# Patient Record
Sex: FEMALE | Race: OTHER | Employment: UNEMPLOYED | URBAN - METROPOLITAN AREA
[De-identification: names, ages, dates, MRNs, and addresses within clinical notes are randomized per-mention and may not be internally consistent; named-entity substitution may affect disease eponyms.]

---

## 2017-06-03 ENCOUNTER — HOSPITAL ENCOUNTER (EMERGENCY)
Facility: HOSPITAL | Age: 42
Discharge: HOME | End: 2017-06-03
Attending: EMERGENCY MEDICINE
Payer: COMMERCIAL

## 2017-06-03 ENCOUNTER — APPOINTMENT (OUTPATIENT)
Dept: GENERAL RADIOLOGY | Facility: HOSPITAL | Age: 42
End: 2017-06-03
Attending: EMERGENCY MEDICINE
Payer: COMMERCIAL

## 2017-06-03 VITALS
HEART RATE: 75 BPM | WEIGHT: 125 LBS | OXYGEN SATURATION: 99 % | RESPIRATION RATE: 16 BRPM | SYSTOLIC BLOOD PRESSURE: 124 MMHG | HEIGHT: 60 IN | DIASTOLIC BLOOD PRESSURE: 67 MMHG | BODY MASS INDEX: 24.54 KG/M2 | TEMPERATURE: 98 F

## 2017-06-03 DIAGNOSIS — J01.00 ACUTE NON-RECURRENT MAXILLARY SINUSITIS: ICD-10-CM

## 2017-06-03 DIAGNOSIS — R42 DIZZINESS: Primary | ICD-10-CM

## 2017-06-03 DIAGNOSIS — F41.0 PANIC ATTACK: ICD-10-CM

## 2017-06-03 PROCEDURE — 99285 EMERGENCY DEPT VISIT HI MDM: CPT

## 2017-06-03 PROCEDURE — 71020 XR CHEST PA + LAT CHEST (CPT=71020): CPT | Performed by: EMERGENCY MEDICINE

## 2017-06-03 PROCEDURE — 93010 ELECTROCARDIOGRAM REPORT: CPT | Performed by: EMERGENCY MEDICINE

## 2017-06-03 PROCEDURE — 80048 BASIC METABOLIC PNL TOTAL CA: CPT | Performed by: EMERGENCY MEDICINE

## 2017-06-03 PROCEDURE — 96360 HYDRATION IV INFUSION INIT: CPT

## 2017-06-03 PROCEDURE — 84484 ASSAY OF TROPONIN QUANT: CPT | Performed by: EMERGENCY MEDICINE

## 2017-06-03 PROCEDURE — 85025 COMPLETE CBC W/AUTO DIFF WBC: CPT | Performed by: EMERGENCY MEDICINE

## 2017-06-03 PROCEDURE — 93005 ELECTROCARDIOGRAM TRACING: CPT

## 2017-06-03 RX ORDER — AMOXICILLIN 500 MG/1
500 TABLET, FILM COATED ORAL 3 TIMES DAILY
Qty: 30 TABLET | Refills: 0 | Status: SHIPPED | OUTPATIENT
Start: 2017-06-03 | End: 2017-06-13

## 2017-06-04 NOTE — ED INITIAL ASSESSMENT (HPI)
Pt here with sudden onset SOB, dizziness, and chest pain this afternoon. Family states she has had \"mucus\" cough for 3 weeks.

## 2017-06-04 NOTE — ED PROVIDER NOTES
Patient Seen in: [facility] AND [facility] Emergency Department    History   Patient presents with:  Chest Pain Angina (cardiovascular)    Stated Complaint: Chest Pain    HPI    The patient is a 40-year-old female who has had nasal congestion facial pressure and air)       Current:/73 mmHg  Pulse 72  Temp(Src) 98 °F (36.7 °C)  Resp 14  Ht 152.4 cm (5')  Wt 56.7 kg  BMI 24.41 kg/m2  SpO2 99%  Breastfeeding? No        Physical Exam   Constitutional: She is oriented to person, place, and time.  She appears well- TROPONIN I, 0 HOUR - Normal   CBC WITH DIFFERENTIAL WITH PLATELET    Narrative: The following orders were created for panel order CBC WITH DIFFERENTIAL WITH PLATELET.   Procedure                               Abnormality         Status

## 2017-06-04 NOTE — ED NOTES
Patient c/o mid-sternal cp that started today and has become worse. Patient does not appear to be in distress, is a/o X4, and acting appropriate for age.

## 2017-09-01 ENCOUNTER — APPOINTMENT (OUTPATIENT)
Dept: ULTRASOUND IMAGING | Facility: HOSPITAL | Age: 42
End: 2017-09-01
Attending: EMERGENCY MEDICINE
Payer: COMMERCIAL

## 2017-09-01 ENCOUNTER — HOSPITAL ENCOUNTER (EMERGENCY)
Facility: HOSPITAL | Age: 42
Discharge: HOME | End: 2017-09-02
Attending: EMERGENCY MEDICINE
Payer: COMMERCIAL

## 2017-09-01 VITALS
TEMPERATURE: 98 F | HEIGHT: 61 IN | RESPIRATION RATE: 18 BRPM | DIASTOLIC BLOOD PRESSURE: 75 MMHG | WEIGHT: 125 LBS | BODY MASS INDEX: 23.6 KG/M2 | HEART RATE: 66 BPM | SYSTOLIC BLOOD PRESSURE: 113 MMHG | OXYGEN SATURATION: 98 %

## 2017-09-01 DIAGNOSIS — R10.13 DYSPEPSIA: Primary | ICD-10-CM

## 2017-09-01 LAB
ALBUMIN SERPL BCP-MCNC: 4.6 G/DL (ref 3.5–4.8)
ALP SERPL-CCNC: 27 U/L (ref 32–100)
ALT SERPL-CCNC: 17 U/L (ref 14–54)
ANION GAP SERPL CALC-SCNC: 11 MMOL/L (ref 0–18)
AST SERPL-CCNC: 27 U/L (ref 15–41)
BASOPHILS # BLD: 0 K/UL (ref 0–0.2)
BASOPHILS NFR BLD: 0 %
BILIRUB DIRECT SERPL-MCNC: 0.2 MG/DL (ref 0–0.2)
BILIRUB SERPL-MCNC: 0.8 MG/DL (ref 0.3–1.2)
BILIRUB UR QL: NEGATIVE
BUN SERPL-MCNC: 14 MG/DL (ref 8–20)
BUN/CREAT SERPL: 18.7 (ref 10–20)
CALCIUM SERPL-MCNC: 9.5 MG/DL (ref 8.5–10.5)
CHLORIDE SERPL-SCNC: 105 MMOL/L (ref 95–110)
CHOLEST SERPL-MCNC: 191 MG/DL (ref 110–200)
CO2 SERPL-SCNC: 20 MMOL/L (ref 22–32)
COLOR UR: YELLOW
CREAT SERPL-MCNC: 0.75 MG/DL (ref 0.5–1.5)
EOSINOPHIL # BLD: 0 K/UL (ref 0–0.7)
EOSINOPHIL NFR BLD: 0 %
ERYTHROCYTE [DISTWIDTH] IN BLOOD BY AUTOMATED COUNT: 11.9 % (ref 11–15)
GLUCOSE SERPL-MCNC: 128 MG/DL (ref 70–99)
GLUCOSE UR-MCNC: NEGATIVE MG/DL
HCT VFR BLD AUTO: 36.8 % (ref 35–48)
HDLC SERPL-MCNC: 50 MG/DL
HGB BLD-MCNC: 12.5 G/DL (ref 12–16)
HGB UR QL STRIP.AUTO: NEGATIVE
HYALINE CASTS #/AREA URNS AUTO: 1 /LPF
KETONES UR-MCNC: NEGATIVE MG/DL
LDLC SERPL CALC-MCNC: 134 MG/DL (ref 0–99)
LIPASE SERPL-CCNC: 29 U/L (ref 22–51)
LYMPHOCYTES # BLD: 1.5 K/UL (ref 1–4)
LYMPHOCYTES NFR BLD: 14 %
MCH RBC QN AUTO: 30.8 PG (ref 27–32)
MCHC RBC AUTO-ENTMCNC: 34 G/DL (ref 32–37)
MCV RBC AUTO: 90.4 FL (ref 80–100)
MONOCYTES # BLD: 0.3 K/UL (ref 0–1)
MONOCYTES NFR BLD: 3 %
NEUTROPHILS # BLD AUTO: 8.3 K/UL (ref 1.8–7.7)
NEUTROPHILS NFR BLD: 82 %
NITRITE UR QL STRIP.AUTO: NEGATIVE
NONHDLC SERPL-MCNC: 141 MG/DL
OSMOLALITY UR CALC.SUM OF ELEC: 284 MOSM/KG (ref 275–295)
PH UR: 8 [PH] (ref 5–8)
PLATELET # BLD AUTO: 264 K/UL (ref 140–400)
PMV BLD AUTO: 8 FL (ref 7.4–10.3)
POTASSIUM SERPL-SCNC: 3.4 MMOL/L (ref 3.3–5.1)
PROT SERPL-MCNC: 7.9 G/DL (ref 5.9–8.4)
PROT UR-MCNC: NEGATIVE MG/DL
RBC # BLD AUTO: 4.07 M/UL (ref 3.7–5.4)
RBC #/AREA URNS AUTO: 1 /HPF
SODIUM SERPL-SCNC: 136 MMOL/L (ref 136–144)
SP GR UR STRIP: 1.02 (ref 1–1.03)
TRIGL SERPL-MCNC: 36 MG/DL (ref 1–149)
UROBILINOGEN UR STRIP-ACNC: <2
VIT C UR-MCNC: NEGATIVE MG/DL
WBC # BLD AUTO: 10.2 K/UL (ref 4–11)
WBC #/AREA URNS AUTO: 11 /HPF

## 2017-09-01 PROCEDURE — 87077 CULTURE AEROBIC IDENTIFY: CPT | Performed by: EMERGENCY MEDICINE

## 2017-09-01 PROCEDURE — 96375 TX/PRO/DX INJ NEW DRUG ADDON: CPT

## 2017-09-01 PROCEDURE — 80048 BASIC METABOLIC PNL TOTAL CA: CPT | Performed by: EMERGENCY MEDICINE

## 2017-09-01 PROCEDURE — 99285 EMERGENCY DEPT VISIT HI MDM: CPT

## 2017-09-01 PROCEDURE — 80061 LIPID PANEL: CPT | Performed by: EMERGENCY MEDICINE

## 2017-09-01 PROCEDURE — 85025 COMPLETE CBC W/AUTO DIFF WBC: CPT | Performed by: EMERGENCY MEDICINE

## 2017-09-01 PROCEDURE — 80076 HEPATIC FUNCTION PANEL: CPT

## 2017-09-01 PROCEDURE — 80076 HEPATIC FUNCTION PANEL: CPT | Performed by: EMERGENCY MEDICINE

## 2017-09-01 PROCEDURE — 96374 THER/PROPH/DIAG INJ IV PUSH: CPT

## 2017-09-01 PROCEDURE — C9113 INJ PANTOPRAZOLE SODIUM, VIA: HCPCS | Performed by: EMERGENCY MEDICINE

## 2017-09-01 PROCEDURE — 93005 ELECTROCARDIOGRAM TRACING: CPT

## 2017-09-01 PROCEDURE — 83690 ASSAY OF LIPASE: CPT | Performed by: EMERGENCY MEDICINE

## 2017-09-01 PROCEDURE — 85025 COMPLETE CBC W/AUTO DIFF WBC: CPT

## 2017-09-01 PROCEDURE — 80061 LIPID PANEL: CPT

## 2017-09-01 PROCEDURE — 76705 ECHO EXAM OF ABDOMEN: CPT | Performed by: EMERGENCY MEDICINE

## 2017-09-01 PROCEDURE — 80048 BASIC METABOLIC PNL TOTAL CA: CPT

## 2017-09-01 PROCEDURE — 96361 HYDRATE IV INFUSION ADD-ON: CPT

## 2017-09-01 PROCEDURE — 87186 SC STD MICRODIL/AGAR DIL: CPT | Performed by: EMERGENCY MEDICINE

## 2017-09-01 PROCEDURE — 93010 ELECTROCARDIOGRAM REPORT: CPT | Performed by: EMERGENCY MEDICINE

## 2017-09-01 PROCEDURE — 87086 URINE CULTURE/COLONY COUNT: CPT | Performed by: EMERGENCY MEDICINE

## 2017-09-01 PROCEDURE — 81001 URINALYSIS AUTO W/SCOPE: CPT | Performed by: EMERGENCY MEDICINE

## 2017-09-01 RX ORDER — ONDANSETRON 2 MG/ML
4 INJECTION INTRAMUSCULAR; INTRAVENOUS ONCE
Status: COMPLETED | OUTPATIENT
Start: 2017-09-01 | End: 2017-09-01

## 2017-09-01 RX ORDER — KETOROLAC TROMETHAMINE 30 MG/ML
30 INJECTION, SOLUTION INTRAMUSCULAR; INTRAVENOUS ONCE
Status: COMPLETED | OUTPATIENT
Start: 2017-09-01 | End: 2017-09-01

## 2017-09-02 NOTE — ED PROVIDER NOTES
Patient Seen in: [facility] AND [facility] Emergency Department     History   Patient presents with:  Chest Pain Angina (cardiovascular)    Stated Complaint: abd pain chest pain vomit since 3pm     HPI    42year old female otherwise healthy complains of sudden on without left periorbital erythema. Nose: Nose normal.   Mouth/Throat: Mucous membranes are normal. Mucous membranes are not pale and not cyanotic. Eyes: Conjunctivae and lids are normal. Right conjunctiva is not injected.  Left conjunctiva is not inject limits   CBC W/ DIFFERENTIAL - Abnormal; Notable for the following:     Neutrophil Absolute 8.3 (*)     All other components within normal limits   LIPASE - Normal   CBC WITH DIFFERENTIAL WITH PLATELET    Narrative:      The following orders were created fo Intravenous Given 9/1/17 2304)   ketorolac tromethamine (TORADOL) 30 MG/ML injection 30 mg (30 mg Intravenous Given 9/1/17 2304)         Procedures: None    Re-Evaluation: 11:40 PM, patient reports symptoms completely resolved with ED treatment     09/01/1 Dyspepsia  (primary encounter diagnosis)    Plan: Follow-up with GI for upper endoscopy and/or HIDA scan, Nexium in the meanwhile. Further Outpatient evaluation and treatment will be required.  I personally discussed the results of the above ED workup and

## 2017-12-05 ENCOUNTER — OFFICE VISIT (OUTPATIENT)
Dept: GASTROENTEROLOGY | Facility: CLINIC | Age: 42
End: 2017-12-05

## 2017-12-05 ENCOUNTER — TELEPHONE (OUTPATIENT)
Dept: GASTROENTEROLOGY | Facility: CLINIC | Age: 42
End: 2017-12-05

## 2017-12-05 VITALS
SYSTOLIC BLOOD PRESSURE: 119 MMHG | BODY MASS INDEX: 22.46 KG/M2 | HEIGHT: 65 IN | WEIGHT: 134.81 LBS | HEART RATE: 74 BPM | DIASTOLIC BLOOD PRESSURE: 76 MMHG

## 2017-12-05 DIAGNOSIS — R10.9 ABDOMINAL PAIN, UNSPECIFIED ABDOMINAL LOCATION: Primary | ICD-10-CM

## 2017-12-05 DIAGNOSIS — R11.10 VOMITING, INTRACTABILITY OF VOMITING NOT SPECIFIED, PRESENCE OF NAUSEA NOT SPECIFIED, UNSPECIFIED VOMITING TYPE: Primary | ICD-10-CM

## 2017-12-05 DIAGNOSIS — R11.2 NAUSEA AND VOMITING, INTRACTABILITY OF VOMITING NOT SPECIFIED, UNSPECIFIED VOMITING TYPE: ICD-10-CM

## 2017-12-05 PROCEDURE — 99244 OFF/OP CNSLTJ NEW/EST MOD 40: CPT | Performed by: INTERNAL MEDICINE

## 2017-12-05 NOTE — TELEPHONE ENCOUNTER
Scheduled for:  EGD  Provider Name: [facility]  Date:  12-15-17  Location:  [facility]  Sedation:  MAC  Time:  10:30am, arrival 9:30am  Prep: NPO after midnight  Meds/Allergies Reconciled?:  yes  Diagnosis with codes:  Vomiting R11.10  Was patient informed to call insura

## 2017-12-05 NOTE — PATIENT INSTRUCTIONS
Vomiting  - possible migraine headaches- see neurologist - Dr. Mckeon [place] [phone]  - EGD with MAC sedation

## 2017-12-05 NOTE — PROGRESS NOTES
True Grams is a 36year old female. HPI:   Patient presents with:  Hospital F/U      The patient is a 41-year-old female who has a 2 year history of intermittent headaches and then subsequent epigastric abdominal pain and vomiting.   The patie EXAM:   Blood pressure 119/76, pulse 74, height 5' 5\" (1.651 m), weight 134 lb 12.8 oz (61.1 kg), not currently breastfeeding.     The patient appears their stated age and is in no acute distress  HEENT- anicteric sclera, neck no lymphadnopathy, OP- clear

## 2017-12-11 NOTE — TELEPHONE ENCOUNTER
Pt would like to cancel this procedure and will call back after first of year to reschedule. Pt would like to see her neurologist first before having procedure. No need to call back unless questions.

## 2017-12-11 NOTE — TELEPHONE ENCOUNTER
Procedure cancelled with endo and removed from schedule.      Forward to surgery schedulers as Norris Regulus

## 2018-01-25 ENCOUNTER — OFFICE VISIT (OUTPATIENT)
Dept: NEUROLOGY | Facility: CLINIC | Age: 43
End: 2018-01-25

## 2018-01-25 VITALS
WEIGHT: 134 LBS | SYSTOLIC BLOOD PRESSURE: 96 MMHG | BODY MASS INDEX: 26.31 KG/M2 | DIASTOLIC BLOOD PRESSURE: 56 MMHG | HEIGHT: 60 IN | RESPIRATION RATE: 16 BRPM | HEART RATE: 54 BPM

## 2018-01-25 DIAGNOSIS — G43.809 OTHER MIGRAINE WITHOUT STATUS MIGRAINOSUS, NOT INTRACTABLE: Primary | ICD-10-CM

## 2018-01-25 PROBLEM — G43.C0 PERIODIC HEADACHE SYNDROME: Status: ACTIVE | Noted: 2018-01-25

## 2018-01-25 PROCEDURE — 99204 OFFICE O/P NEW MOD 45 MIN: CPT | Performed by: OTHER

## 2018-01-25 RX ORDER — ELETRIPTAN HYDROBROMIDE 40 MG/1
40 TABLET, FILM COATED ORAL AS NEEDED
Qty: 8 TABLET | Refills: 0 | Status: SHIPPED | OUTPATIENT
Start: 2018-01-25 | End: 2018-02-24

## 2018-01-26 NOTE — PROGRESS NOTES
She relates a 4–5 year history occurring about once every other month of left greater than right orbital for head throbbing headache nausea, emesis, blurred vision. No double vision. She denies focal symptoms in the arms or legs.   No nocturnal awakening Smoker                                                                Smokeless tobacco: Never Used                        Alcohol use: No            Other Topics            Concern  Caffeine Concern        Yes  Exercise                Yes    Comment:leona hoffmans, no clonus  Coordination: Finger to nose, heel to shin intact bilaterally. No Ataxia noted. Gait: Narrow based, negative Romberg’s sign, heel to shin intact. ASSESSMENT AND PLAN:   Migraine headaches. Gave her prescription Relpax.   Asked her

## 2018-02-16 ENCOUNTER — TELEPHONE (OUTPATIENT)
Dept: NEUROLOGY | Facility: CLINIC | Age: 43
End: 2018-02-16

## 2018-02-16 DIAGNOSIS — R51.9 INTRACTABLE HEADACHE, UNSPECIFIED CHRONICITY PATTERN, UNSPECIFIED HEADACHE TYPE: Primary | ICD-10-CM

## 2018-02-16 NOTE — TELEPHONE ENCOUNTER
Please tell her to take the medication as soon as the headache starts. It is more effective that way. Please tell her to proceed with MRI of the brain, order placed. Have her call the office 2 days after the MRI scan has been completed.   Thank you

## 2018-02-16 NOTE — TELEPHONE ENCOUNTER
Spoke to patient who previously called to give Dr. Maricarmen Prabhakar a medication update. Patient stated she was getting migraines and when it was onset, she also vomited a lot.  She mentioned that her migraines have gotten better since starting on Eletriptan 40mg a

## 2018-02-16 NOTE — TELEPHONE ENCOUNTER
[employer] for Authorization of Approval for MRI Brain, Approval was given with Ref #[id number] valid until 03/17/2018  Will call patient to inform of the Approval, L/M for patient to c/b with any questions or concerns

## (undated) NOTE — ED AVS SNAPSHOT
[facility] Emergency Department    [address]     [place] [address]    Phone:  [phone]    Fax:  [phone]           Jania White   MRN: [id number]    Department:  [facility] Emergency Department   Date of Visit: and Class Registration line at [phone] or find a doctor online by visiting [email or web address].    IF THERE IS ANY CHANGE OR WORSENING OF YOUR CONDITION, CALL YOUR PRIMARY CARE PHYSICIAN AT ONCE OR RETURN IMMEDIATELY TO [address] [place] [address].     If

## (undated) NOTE — ED AVS SNAPSHOT
[facility] Emergency Department    [address]     [place] [address]    Phone:  [phone]    Fax:  [phone]           Yang Madera   MRN: [id number]    Department:  [facility] Emergency Department   Date of Visit: please call our  at [phone]. Si tiene problemas para programar rené ambar de seguimiento según lo indicado, llame al encargado de gio al [phone].     It is our goal to assure that you are completely satisfied with every aspect o doctor until you can check with your doctor. Please bring the medication list to your next doctor's appointment. Any imaging studies and labs completed today can be reviewed in your [employer] account.   You may have had testing done that requires us to co and ask to get set up for an insurance coverage that is in-network with [facility]. [email or web address]     Sign up for [email or web address], your secure online medical record.   [email or web address] will allow you to access patient instructions from your recent visit,  view

## (undated) NOTE — ED AVS SNAPSHOT
Xiang Cedeno   MRN: [id number]    Department:  [facility] Emergency Department   Date of Visit:  9/1/2017           Disclosure     Insurance plans vary and the physician(s) referred by the ER may not be covered by your plan.  Please con CARE PHYSICIAN AT ONCE OR RETURN IMMEDIATELY TO THE EMERGENCY DEPARTMENT. If you have been prescribed any medication(s), please fill your prescription right away and begin taking the medication(s) as directed.   If you believe that any of the medications

## (undated) NOTE — LETTER
Date & Time: 9/12/2017, 7:56 AM  Patient: Warren Triston    Dear Warren Goldstein,    We are unable to reach you by phone and would like to follow up with you regarding your visit to the Emergency Department.         Please contact the [facility]